# Patient Record
Sex: FEMALE | Race: WHITE | NOT HISPANIC OR LATINO | Employment: FULL TIME | URBAN - METROPOLITAN AREA
[De-identification: names, ages, dates, MRNs, and addresses within clinical notes are randomized per-mention and may not be internally consistent; named-entity substitution may affect disease eponyms.]

---

## 2017-05-08 ENCOUNTER — APPOINTMENT (OUTPATIENT)
Dept: OCCUPATIONAL MEDICINE | Facility: CLINIC | Age: 37
End: 2017-05-08
Payer: OTHER MISCELLANEOUS

## 2017-05-08 PROCEDURE — 99203 OFFICE O/P NEW LOW 30 MIN: CPT

## 2017-05-09 ENCOUNTER — HOSPITAL ENCOUNTER (EMERGENCY)
Facility: HOSPITAL | Age: 37
Discharge: HOME | End: 2017-05-09
Attending: EMERGENCY MEDICINE
Payer: OTHER MISCELLANEOUS

## 2017-05-09 VITALS
OXYGEN SATURATION: 100 % | BODY MASS INDEX: 39.87 KG/M2 | DIASTOLIC BLOOD PRESSURE: 82 MMHG | RESPIRATION RATE: 17 BRPM | TEMPERATURE: 97 F | SYSTOLIC BLOOD PRESSURE: 144 MMHG | HEIGHT: 67 IN | HEART RATE: 85 BPM | WEIGHT: 254 LBS

## 2017-05-09 DIAGNOSIS — M54.16 LUMBAR RADICULOPATHY: Primary | ICD-10-CM

## 2017-05-09 PROCEDURE — 99283 EMERGENCY DEPT VISIT LOW MDM: CPT

## 2017-05-09 PROCEDURE — 96372 THER/PROPH/DIAG INJ SC/IM: CPT

## 2017-05-09 RX ORDER — NAPROXEN 500 MG/1
500 TABLET ORAL 2 TIMES DAILY WITH MEALS
COMMUNITY

## 2017-05-09 RX ORDER — METHOCARBAMOL 500 MG/1
500 TABLET, FILM COATED ORAL 4 TIMES DAILY
COMMUNITY

## 2017-05-09 RX ORDER — TRAMADOL HYDROCHLORIDE 50 MG/1
50 TABLET ORAL EVERY 6 HOURS PRN
COMMUNITY
End: 2017-05-09

## 2017-05-09 RX ORDER — KETOROLAC TROMETHAMINE 30 MG/ML
60 INJECTION, SOLUTION INTRAMUSCULAR; INTRAVENOUS ONCE
Status: COMPLETED | OUTPATIENT
Start: 2017-05-09 | End: 2017-05-09

## 2017-05-09 RX ORDER — TRAMADOL HYDROCHLORIDE 50 MG/1
100 TABLET ORAL EVERY 6 HOURS PRN
Qty: 16 TABLET | Refills: 0 | Status: SHIPPED | OUTPATIENT
Start: 2017-05-09

## 2017-05-09 RX ADMIN — KETOROLAC TROMETHAMINE 60 MG: 30 INJECTION, SOLUTION INTRAMUSCULAR at 10:12

## 2017-05-16 ENCOUNTER — APPOINTMENT (OUTPATIENT)
Dept: OCCUPATIONAL MEDICINE | Facility: CLINIC | Age: 37
End: 2017-05-16
Payer: OTHER MISCELLANEOUS

## 2017-05-16 PROCEDURE — 99213 OFFICE O/P EST LOW 20 MIN: CPT

## 2017-05-22 ENCOUNTER — APPOINTMENT (OUTPATIENT)
Dept: OCCUPATIONAL MEDICINE | Facility: CLINIC | Age: 37
End: 2017-05-22
Payer: OTHER MISCELLANEOUS

## 2017-05-22 PROCEDURE — 99213 OFFICE O/P EST LOW 20 MIN: CPT

## 2019-07-31 ENCOUNTER — HOSPITAL ENCOUNTER (EMERGENCY)
Facility: HOSPITAL | Age: 39
Discharge: HOME | End: 2019-07-31
Admitting: EMERGENCY MEDICINE
Payer: COMMERCIAL

## 2019-07-31 VITALS
HEIGHT: 67 IN | TEMPERATURE: 97.7 F | OXYGEN SATURATION: 95 % | SYSTOLIC BLOOD PRESSURE: 129 MMHG | RESPIRATION RATE: 18 BRPM | BODY MASS INDEX: 36.88 KG/M2 | DIASTOLIC BLOOD PRESSURE: 77 MMHG | WEIGHT: 235 LBS | HEART RATE: 99 BPM

## 2019-07-31 DIAGNOSIS — S51.811A LACERATION OF RIGHT FOREARM: Primary | ICD-10-CM

## 2019-07-31 PROCEDURE — 99282 EMERGENCY DEPT VISIT SF MDM: CPT | Performed by: PHYSICIAN ASSISTANT

## 2019-07-31 PROCEDURE — 12002 RPR S/N/AX/GEN/TRNK2.6-7.5CM: CPT | Performed by: PHYSICIAN ASSISTANT

## 2019-07-31 PROCEDURE — 90471 IMMUNIZATION ADMIN: CPT

## 2019-07-31 PROCEDURE — 90715 TDAP VACCINE 7 YRS/> IM: CPT | Performed by: PHYSICIAN ASSISTANT

## 2019-07-31 PROCEDURE — 99282 EMERGENCY DEPT VISIT SF MDM: CPT

## 2019-07-31 RX ORDER — GINSENG 100 MG
1 CAPSULE ORAL ONCE
Status: COMPLETED | OUTPATIENT
Start: 2019-07-31 | End: 2019-07-31

## 2019-07-31 RX ORDER — LIDOCAINE HYDROCHLORIDE 10 MG/ML
5 INJECTION, SOLUTION EPIDURAL; INFILTRATION; INTRACAUDAL; PERINEURAL ONCE
Status: COMPLETED | OUTPATIENT
Start: 2019-07-31 | End: 2019-07-31

## 2019-07-31 RX ADMIN — TETANUS TOXOID, REDUCED DIPHTHERIA TOXOID AND ACELLULAR PERTUSSIS VACCINE, ADSORBED 0.5 ML: 5; 2.5; 8; 8; 2.5 SUSPENSION INTRAMUSCULAR at 21:19

## 2019-07-31 RX ADMIN — BACITRACIN 1 SMALL APPLICATION: 500 OINTMENT TOPICAL at 21:18

## 2019-07-31 RX ADMIN — LIDOCAINE HYDROCHLORIDE 5 ML: 10 INJECTION, SOLUTION EPIDURAL; INFILTRATION; INTRACAUDAL; PERINEURAL at 21:18

## 2019-08-01 NOTE — DISCHARGE INSTRUCTIONS
Rest, elevate arm  Tylenol/motrin for discomfort  Keep bandage on for 2 days, then remove bandage and clean daily with soap and water, apply antibiotic ointment    Follow up with family doctor in 7-10 days for suture removal

## 2019-08-01 NOTE — ED PROVIDER NOTES
History  Chief Complaint   Patient presents with    Extremity Laceration     Pt c/o of an laceration on right forearm form an exacto knife  Patient is a 34 y/o F that presents to the ED with laceration to right forearm that occurred 10 minutes ago  She states [place] and she cut herself with an exacto knife  She is right handed  No numbness  Last tetanus is unknown  History provided by:  Patient  Laceration   Location:  Shoulder/arm  Shoulder/arm laceration location:  R forearm  Length:  3  Depth: Through dermis  Quality: straight    Time since incident:  10 minutes  Laceration mechanism:  Knife  Pain details:     Quality:  Burning    Severity:  Mild    Timing:  Constant    Progression:  Unchanged  Foreign body present:  No foreign bodies  Relieved by:  Nothing  Worsened by:  Nothing  Ineffective treatments:  None tried  Tetanus status:  Unknown  Associated symptoms: no fever, no numbness and no swelling        Prior to Admission Medications   Prescriptions Last Dose Informant Patient Reported? Taking? methocarbamol (ROBAXIN) 500 mg tablet   Yes No   Sig: Take 500 mg by mouth 4 (four) times a day   naproxen (NAPROSYN) 500 mg tablet   Yes No   Sig: Take 500 mg by mouth 2 (two) times a day with meals   traMADol (ULTRAM) 50 mg tablet   No No   Sig: Take 2 tablets by mouth every 6 (six) hours as needed for severe pain for up to 16 doses      Facility-Administered Medications: None       Past Medical History:   Diagnosis Date    Chronic pain        Past Surgical History:   Procedure Laterality Date    ENDOMETRIAL ABLATION      MULTIPLE TOOTH EXTRACTIONS      TUBAL LIGATION      WISDOM TOOTH EXTRACTION         History reviewed  No pertinent family history  I have reviewed and agree with the history as documented      Social History     Tobacco Use    Smoking status: Current Every Day Smoker     Packs/day: 0 50    Smokeless tobacco: Never Used Substance Use Topics    Alcohol use: No    Drug use: No        Review of Systems   Constitutional: Negative for chills and fever  Skin: Positive for wound  Neurological: Negative for dizziness, weakness and numbness  All other systems reviewed and are negative  Physical Exam  Physical Exam   Constitutional: She is oriented to person, place, and time  She appears well-developed and well-nourished  HENT:   Head: Normocephalic and atraumatic  Eyes: Conjunctivae are normal    Cardiovascular: Normal rate  Pulses:       Radial pulses are 2+ on the right side  Pulmonary/Chest: Effort normal    Musculoskeletal:        Right forearm: She exhibits laceration (3cm linear laceration to right forearm, bleeding controlled with pressure, will require sutures  )  She exhibits no tenderness, no bony tenderness, no swelling and no deformity  Neurological: She is alert and oriented to person, place, and time  She has normal strength  No sensory deficit  Gait normal    Skin: Skin is warm and dry  Laceration (3cm linear laceration right forearm  ) noted  No rash noted  She is not diaphoretic  No pallor  Nursing note and vitals reviewed        Vital Signs  ED Triage Vitals [07/31/19 2115]   Temperature Pulse Respirations Blood Pressure SpO2   97 7 °F (36 5 °C) 99 18 129/77 95 %      Temp Source Heart Rate Source Patient Position - Orthostatic VS BP Location FiO2 (%)   Tympanic Monitor -- Right arm --      Pain Score       2           Vitals:    07/31/19 2115   BP: 129/77   Pulse: 99         Visual Acuity      ED Medications  Medications   lidocaine (PF) (XYLOCAINE-MPF) 1 % injection 5 mL (5 mL Infiltration Given 7/31/19 2118)   bacitracin topical ointment 1 small application (1 small application Topical Given 7/31/19 2118)   tetanus-diphtheria-acellular pertussis (BOOSTRIX) IM injection 0 5 mL (0 5 mL Intramuscular Given 7/31/19 2119)       Diagnostic Studies  Results Reviewed     None                 No orders to display              Procedures  Laceration repair  Date/Time: 7/31/2019 9:20 PM  Performed by: Tray Burns PA-C  Authorized by: Tray Burns PA-C   Consent: Verbal consent obtained  Risks and benefits: risks, benefits and alternatives were discussed  Consent given by: patient  Body area: upper extremity  Location details: right lower arm  Laceration length: 3 cm  Foreign bodies: no foreign bodies  Tendon involvement: none  Nerve involvement: none  Vascular damage: no  Anesthesia: local infiltration    Anesthesia:  Local Anesthetic: lidocaine 1% without epinephrine  Anesthetic total: 3 mL      Procedure Details:  Preparation: Patient was prepped and draped in the usual sterile fashion  Irrigation solution: saline  Irrigation method: tap  Amount of cleaning: standard  Debridement: none  Degree of undermining: none  Skin closure: 5-0 nylon  Number of sutures: 7  Technique: simple  Approximation: close  Approximation difficulty: simple  Dressing: antibiotic ointment and 4x4 sterile gauze  Patient tolerance: Patient tolerated the procedure well with no immediate complications             ED Course                               MDM  Number of Diagnoses or Management Options  Laceration of right forearm: new and does not require workup  Patient Progress  Patient progress: improved      Disposition  Final diagnoses:   Laceration of right forearm     Time reflects when diagnosis was documented in both MDM as applicable and the Disposition within this note     Time User Action Codes Description Comment    7/31/2019  9:40 PM Becky Cox Add [J35 [id number]] Laceration of right forearm       ED Disposition     ED Disposition Condition Date/Time Comment    Discharge Stable Wed Jul 31, 2019  9:40 PM Rylee Blount discharge to home/self care              Follow-up Information     Follow up With Specialties Details Why Contact Info Additional [address] [facility] Call in 1 week For recheck [address] [place], [place], [address]          Patient's Medications   Discharge Prescriptions    No medications on file     No discharge procedures on file      ED Provider  Electronically Signed by           Romain Hill PA-C  07/31/19 6146

## 2019-08-08 ENCOUNTER — APPOINTMENT (OUTPATIENT)
Dept: RADIOLOGY | Facility: CLINIC | Age: 39
End: 2019-08-08
Payer: COMMERCIAL

## 2019-08-08 ENCOUNTER — OFFICE VISIT (OUTPATIENT)
Dept: URGENT CARE | Facility: CLINIC | Age: 39
End: 2019-08-08
Payer: COMMERCIAL

## 2019-08-08 VITALS
OXYGEN SATURATION: 98 % | HEIGHT: 67 IN | DIASTOLIC BLOOD PRESSURE: 76 MMHG | BODY MASS INDEX: 36.1 KG/M2 | WEIGHT: 230 LBS | SYSTOLIC BLOOD PRESSURE: 132 MMHG | RESPIRATION RATE: 16 BRPM | TEMPERATURE: 98 F | HEART RATE: 84 BPM

## 2019-08-08 DIAGNOSIS — Z48.02 ENCOUNTER FOR REMOVAL OF SUTURES: Primary | ICD-10-CM

## 2019-08-08 DIAGNOSIS — S69.91XA HAND INJURY, RIGHT, INITIAL ENCOUNTER: ICD-10-CM

## 2019-08-08 PROCEDURE — 73130 X-RAY EXAM OF HAND: CPT

## 2019-08-08 PROCEDURE — 99213 OFFICE O/P EST LOW 20 MIN: CPT | Performed by: PREVENTIVE MEDICINE

## 2019-08-08 NOTE — PATIENT INSTRUCTIONS
You can use ibuprofen 2 tabs 4 times a day for aching in the right hand  We will call you with radiologist finds a fracture that I have missed

## 2019-08-08 NOTE — PROGRESS NOTES
[facility]        NAME: Cat Jimenez is a 35 y o  female  : 1980    MRN: [id number]  DATE: 2019  TIME: 5:48 PM    Assessment and Plan   Encounter for removal of sutures [Z48 02]  1  Encounter for removal of sutures     2  Hand injury, right, initial encounter  XR hand 3+ vw right         Patient Instructions       Follow up with PCP in 3-5 days  Proceed to  ER if symptoms worsen  Chief Complaint     Chief Complaint   Patient presents with    Suture / Staple Removal     pt had seven sutures placed 8 days ago in her right wrist  she is here to have them removed   Hand Pain     pt reports falling two days ago  yesterday she woke up and "cracked" her fingers on her right hand  she developed pain in her right hand  History of Present Illness       Here for suture removal right forearm 7 sutures  Also 2 days ago she fell on her left side and while she fell she felt pain in her right hand due to the instrument she was holding  She now has pain over the right 4th and 3rd metacarpal       Review of Systems   Review of Systems   Musculoskeletal: Positive for arthralgias           Current Medications       Current Outpatient Medications:     methocarbamol (ROBAXIN) 500 mg tablet, Take 500 mg by mouth 4 (four) times a day, Disp: , Rfl:     naproxen (NAPROSYN) 500 mg tablet, Take 500 mg by mouth 2 (two) times a day with meals, Disp: , Rfl:     traMADol (ULTRAM) 50 mg tablet, Take 2 tablets by mouth every 6 (six) hours as needed for severe pain for up to 16 doses, Disp: 16 tablet, Rfl: 0    Current Allergies     Allergies as of 2019 - Reviewed 2019   Allergen Reaction Noted    Amoxicillin  2017    Latex  2017    Percocet [oxycodone-acetaminophen]  2017    Shellfish-derived products  2017            The following portions of the patient's history were reviewed and updated as appropriate: allergies, current medications, past family history, past medical history, past social history, past surgical history and problem list      Past Medical History:   Diagnosis Date    Chronic pain        Past Surgical History:   Procedure Laterality Date    ENDOMETRIAL ABLATION      MULTIPLE TOOTH EXTRACTIONS      TUBAL LIGATION      WISDOM TOOTH EXTRACTION         No family history on file  Medications have been verified  Objective   /76   Pulse 84   Temp 98 °F (36 7 °C)   Resp 16   Ht 5' 7 25" (1 708 m)   Wt 104 kg (230 lb)   SpO2 98%   BMI 35 76 kg/m²        Physical Exam     Physical Exam   Musculoskeletal:   The right hand is not warm red or swollen  Minimal tenderness to palpation over the proximal base of the 3rd and 4th metacarpal   Excellent range of motion of the hand and strength  Skin:   Seven sutures removed right forearm  Excellent healing without infection       X-ray reveals no acute changes

## 2020-12-16 ENCOUNTER — OFFICE VISIT (OUTPATIENT)
Dept: URGENT CARE | Facility: CLINIC | Age: 40
End: 2020-12-16
Payer: COMMERCIAL

## 2020-12-16 VITALS
WEIGHT: 237 LBS | SYSTOLIC BLOOD PRESSURE: 142 MMHG | HEIGHT: 67 IN | DIASTOLIC BLOOD PRESSURE: 82 MMHG | BODY MASS INDEX: 37.2 KG/M2 | TEMPERATURE: 96.2 F | OXYGEN SATURATION: 98 % | RESPIRATION RATE: 16 BRPM | HEART RATE: 78 BPM

## 2020-12-16 DIAGNOSIS — T61.91XA ALLERGIC REACTION TO SEAFOOD: Primary | ICD-10-CM

## 2020-12-16 PROCEDURE — 99213 OFFICE O/P EST LOW 20 MIN: CPT | Performed by: PHYSICIAN ASSISTANT

## 2020-12-16 RX ORDER — PREDNISONE 10 MG/1
TABLET ORAL
Qty: 21 TABLET | Refills: 0 | Status: SHIPPED | OUTPATIENT
Start: 2020-12-16 | End: 2021-07-01 | Stop reason: ALTCHOICE

## 2021-07-01 ENCOUNTER — OFFICE VISIT (OUTPATIENT)
Dept: URGENT CARE | Facility: CLINIC | Age: 41
End: 2021-07-01
Payer: COMMERCIAL

## 2021-07-01 VITALS — OXYGEN SATURATION: 98 % | HEART RATE: 76 BPM | TEMPERATURE: 97.6 F | RESPIRATION RATE: 16 BRPM

## 2021-07-01 DIAGNOSIS — L23.9 ALLERGIC CONTACT DERMATITIS, UNSPECIFIED TRIGGER: Primary | ICD-10-CM

## 2021-07-01 PROCEDURE — 99213 OFFICE O/P EST LOW 20 MIN: CPT | Performed by: PHYSICIAN ASSISTANT

## 2021-07-01 RX ORDER — METHYLPREDNISOLONE SODIUM SUCCINATE 125 MG/2ML
125 INJECTION, POWDER, LYOPHILIZED, FOR SOLUTION INTRAMUSCULAR; INTRAVENOUS ONCE
Status: COMPLETED | OUTPATIENT
Start: 2021-07-01 | End: 2021-07-01

## 2021-07-01 RX ORDER — TRIAMCINOLONE ACETONIDE 1 MG/G
CREAM TOPICAL 2 TIMES DAILY
Qty: 45 G | Refills: 0 | Status: SHIPPED | OUTPATIENT
Start: 2021-07-01

## 2021-07-01 RX ADMIN — METHYLPREDNISOLONE SODIUM SUCCINATE 125 MG: 125 INJECTION, POWDER, LYOPHILIZED, FOR SOLUTION INTRAMUSCULAR; INTRAVENOUS at 17:05

## 2021-07-01 NOTE — PATIENT INSTRUCTIONS
Contact Dermatitis   WHAT YOU NEED TO KNOW:   Contact dermatitis is a skin rash  It develops when you touch something that irritates your skin or causes an allergic reaction  DISCHARGE INSTRUCTIONS:   Call 911 for any of the following:   · You have sudden trouble breathing  · Your throat swells and you have trouble eating  · Your face is swollen  Contact your healthcare provider if:   · You have a fever  · Your blisters are draining pus  · Your rash spreads or does not get better, even after treatment  · You have questions or concerns about your condition or care  Medicines:   · Medicines  help decrease itching and swelling  They will be given as a topical medicine to apply to your rash or as a pill  · Take your medicine as directed  Contact your healthcare provider if you think your medicine is not helping or if you have side effects  Tell him or her if you are allergic to any medicine  Keep a list of the medicines, vitamins, and herbs you take  Include the amounts, and when and why you take them  Bring the list or the pill bottles to follow-up visits  Carry your medicine list with you in case of an emergency  Manage contact dermatitis:   · Take short baths or showers in cool water  Use mild soap or soap-free cleansers  Add oatmeal, baking soda, or cornstarch to the bath water to help decrease skin irritation  · Avoid skin irritants , such as makeup, hair products, soaps, and cleansers  Use products that do not contain perfume or dye  · Apply a cool compress to your rash  This will help soothe your skin  · Keep your skin moist   Rub unscented cream or lotion on your skin to prevent dryness and itching  Do this right after a bath or shower when your skin is still damp  Follow up with your healthcare provider or dermatologist in 2 to 3 days:  Write down your questions so you remember to ask them during your visits     © Copyright [employer] 2020 Information is for End User's use only and may not be sold, redistributed or otherwise used for commercial purposes  All illustrations and images included in CareNotes® are the copyrighted property of [employer]  or [employer]bil Joel  The above information is an  only  It is not intended as medical advice for individual conditions or treatments  Talk to your doctor, nurse or pharmacist before following any medical regimen to see if it is safe and effective for you

## 2021-07-08 NOTE — PROGRESS NOTES
[facility]        NAME: Amira Lemons is a 38 y o  female  : 1980    MRN: [id number]  DATE:  2021  TIME: 9:01 AM    Assessment and Plan   Allergic contact dermatitis, unspecified trigger [L23 9]  1  Allergic contact dermatitis, unspecified trigger  methylPREDNISolone sodium succinate (Solu-MEDROL) injection 125 mg    triamcinolone (KENALOG) 0 1 % cream         Patient Instructions      discussed condition with patient  She appears to have allergic contact dermatitis to uncertain trigger  She will be given a Solu-Medrol injection as well as topical steroid cream and recommended over-the-counter antihistamines  Should be re-evaluated if condition persists or worsens  Follow up with PCP in 3-5 days  Proceed to  ER if symptoms worsen  Chief Complaint     Chief Complaint   Patient presents with    Rash     itchy rash on her arms and legs  began four days ago  History of Present Illness         Patient presents with four-day history of a diffuse itchy rash spread across both her upper and lower extremities  She denies any new meds, foods, personal skin care products, detergents  She denies any shortness of breath or wheezing, lips/tongue/ throat swelling or tingling, or any other significant symptoms  She has been treating with over-the-counter medications without significant success  Review of Systems   Review of Systems   Constitutional: Negative  HENT: Negative  Respiratory: Negative  Cardiovascular: Negative  Gastrointestinal: Negative  Genitourinary: Negative  Skin: Positive for rash           Current Medications       Current Outpatient Medications:     methocarbamol (ROBAXIN) 500 mg tablet, Take 500 mg by mouth 4 (four) times a day (Patient not taking: Reported on 2021), Disp: , Rfl:     naproxen (NAPROSYN) 500 mg tablet, Take 500 mg by mouth 2 (two) times a day with meals (Patient not taking: Reported on 2021), Disp: , Rfl:    traMADol (ULTRAM) 50 mg tablet, Take 2 tablets by mouth every 6 (six) hours as needed for severe pain for up to 16 doses (Patient not taking: Reported on 12/16/2020), Disp: 16 tablet, Rfl: 0    triamcinolone (KENALOG) 0 1 % cream, Apply topically 2 (two) times a day, Disp: 45 g, Rfl: 0    Current Allergies     Allergies as of 07/01/2021 - Reviewed 07/01/2021   Allergen Reaction Noted    Amoxicillin  05/09/2017    Latex  05/09/2017    Percocet [oxycodone-acetaminophen]  05/09/2017    Shellfish-derived products - food allergy  05/09/2017            The following portions of the patient's history were reviewed and updated as appropriate: allergies, current medications, past family history, past medical history, past social history, past surgical history and problem list      Past Medical History:   Diagnosis Date    Chronic pain        Past Surgical History:   Procedure Laterality Date    ENDOMETRIAL ABLATION      MULTIPLE TOOTH EXTRACTIONS      TUBAL LIGATION      WISDOM TOOTH EXTRACTION         History reviewed  No pertinent family history  Medications have been verified  Objective   Pulse 76   Temp 97 6 °F (36 4 °C)   Resp 16   SpO2 98%   No LMP recorded  Patient has had an ablation  Physical Exam     Physical Exam  Vitals reviewed  Constitutional:       General: She is not in acute distress  Appearance: She is well-developed  HENT:      Mouth/Throat:      Mouth: Mucous membranes are moist  No angioedema  Pharynx: Oropharynx is clear  No pharyngeal swelling or uvula swelling  Cardiovascular:      Rate and Rhythm: Normal rate and regular rhythm  Pulses: Normal pulses  Heart sounds: Normal heart sounds  No murmur heard  Pulmonary:      Effort: Pulmonary effort is normal  No respiratory distress  Breath sounds: Normal breath sounds     Skin:     Findings: Rash (  BilateralArms and legs with diffuse patchy erythematous maculopapular rash that appears inflammatory  There are no areas of blistering or weeping  No significant swelling  No sign of infection) present  Neurological:      Mental Status: She is alert and oriented to person, place, and time

## 2021-09-24 ENCOUNTER — OFFICE VISIT (OUTPATIENT)
Dept: URGENT CARE | Facility: CLINIC | Age: 41
End: 2021-09-24
Payer: COMMERCIAL

## 2021-09-24 VITALS
HEIGHT: 67 IN | TEMPERATURE: 96.8 F | BODY MASS INDEX: 37.2 KG/M2 | DIASTOLIC BLOOD PRESSURE: 78 MMHG | WEIGHT: 237 LBS | OXYGEN SATURATION: 98 % | HEART RATE: 100 BPM | RESPIRATION RATE: 16 BRPM | SYSTOLIC BLOOD PRESSURE: 132 MMHG

## 2021-09-24 DIAGNOSIS — L55.9 SUNBURN: ICD-10-CM

## 2021-09-24 DIAGNOSIS — L03.119 CELLULITIS OF LOWER LEG: Primary | ICD-10-CM

## 2021-09-24 PROCEDURE — 99213 OFFICE O/P EST LOW 20 MIN: CPT | Performed by: PHYSICIAN ASSISTANT

## 2021-09-24 RX ORDER — CEPHALEXIN 500 MG/1
500 CAPSULE ORAL EVERY 8 HOURS SCHEDULED
Qty: 21 CAPSULE | Refills: 0 | Status: SHIPPED | OUTPATIENT
Start: 2021-09-24 | End: 2021-10-01

## 2021-09-24 RX ORDER — PREDNISONE 50 MG/1
50 TABLET ORAL DAILY
Qty: 5 TABLET | Refills: 0 | Status: SHIPPED | OUTPATIENT
Start: 2021-09-24 | End: 2021-09-29

## 2021-09-24 NOTE — PROGRESS NOTES
[facility]        NAME: Olesya Galicia is a 42 y o  female  : 1980    MRN: [id number]  DATE: 2021  TIME: 9:54 AM    Assessment and Plan   Cellulitis of lower leg [L03 119]  1  Cellulitis of lower leg  cephalexin (KEFLEX) 500 mg capsule   2  Sunburn  predniSONE 50 mg tablet         Patient Instructions     Take antibiotic as directed until completed  Take prednisone as directed until completed  motrin and/or tylenol as needed for pain  Follow up with PCP in 3-5 days  Proceed to  ER if symptoms worsen  Chief Complaint     Chief Complaint   Patient presents with    Leg Swelling     pt reports getting sunburn on her b/lLE monday  yesterady the pt's legs became more red and the right leg became swollen         History of Present Illness        44-year-old female presents with redness pain swelling on bilateral lower legs  Patient reports she was outside on Monday and got sunburn on her lower legs  Continues to have lot of pain redness and swelling that she feels is worsening  Denies any fevers or chills  Has taking some Benadryl without any relief  Denies any chest pain shortness of breath or cough  No abdominal pain nausea vomiting or diarrhea    Rash  This is a new problem  The current episode started in the past 7 days  The problem has been gradually worsening since onset  Location: Bilateral lower legs  The rash is characterized by burning, pain, redness and swelling  Pertinent negatives include no cough, fatigue, fever or shortness of breath  Past treatments include antihistamine  The treatment provided no relief  Review of Systems   Review of Systems   Constitutional: Negative  Negative for fatigue and fever  HENT: Negative  Eyes: Negative  Respiratory: Negative  Negative for cough and shortness of breath  Cardiovascular: Negative  Gastrointestinal: Negative  Musculoskeletal: Negative  Skin: Positive for rash  Neurological: Negative  Current Medications       Current Outpatient Medications:     cephalexin (KEFLEX) 500 mg capsule, Take 1 capsule (500 mg total) by mouth every 8 (eight) hours for 7 days, Disp: 21 capsule, Rfl: 0    methocarbamol (ROBAXIN) 500 mg tablet, Take 500 mg by mouth 4 (four) times a day (Patient not taking: Reported on 7/1/2021), Disp: , Rfl:     naproxen (NAPROSYN) 500 mg tablet, Take 500 mg by mouth 2 (two) times a day with meals (Patient not taking: Reported on 7/1/2021), Disp: , Rfl:     predniSONE 50 mg tablet, Take 1 tablet (50 mg total) by mouth daily for 5 days, Disp: 5 tablet, Rfl: 0    traMADol (ULTRAM) 50 mg tablet, Take 2 tablets by mouth every 6 (six) hours as needed for severe pain for up to 16 doses (Patient not taking: Reported on 12/16/2020), Disp: 16 tablet, Rfl: 0    triamcinolone (KENALOG) 0 1 % cream, Apply topically 2 (two) times a day (Patient not taking: Reported on 9/24/2021), Disp: 45 g, Rfl: 0    Current Allergies     Allergies as of 09/24/2021 - Reviewed 09/24/2021   Allergen Reaction Noted    Amoxicillin  05/09/2017    Latex  05/09/2017    Percocet [oxycodone-acetaminophen]  05/09/2017    Shellfish-derived products - food allergy  05/09/2017            The following portions of the patient's history were reviewed and updated as appropriate: allergies, current medications, past family history, past medical history, past social history, past surgical history and problem list      Past Medical History:   Diagnosis Date    Chronic pain        Past Surgical History:   Procedure Laterality Date    ENDOMETRIAL ABLATION      MULTIPLE TOOTH EXTRACTIONS      TUBAL LIGATION      WISDOM TOOTH EXTRACTION         History reviewed  No pertinent family history  Medications have been verified  Objective   /78   Pulse 100   Temp (!) 96 8 °F (36 °C)   Resp 16   Ht 5' 7" (1 702 m)   Wt 108 kg (237 lb)   SpO2 98%   BMI 37 12 kg/m²   No LMP recorded   Patient has had an ablation  Physical Exam     Physical Exam  Vitals and nursing note reviewed  Constitutional:       General: She is not in acute distress  Appearance: She is well-developed  HENT:      Head: Normocephalic and atraumatic  Right Ear: Hearing, tympanic membrane, ear canal and external ear normal       Left Ear: Hearing, tympanic membrane, ear canal and external ear normal       Nose: Nose normal       Mouth/Throat:      Pharynx: Uvula midline  No oropharyngeal exudate  Eyes:      General:         Right eye: No discharge  Left eye: No discharge  Conjunctiva/sclera: Conjunctivae normal    Cardiovascular:      Rate and Rhythm: Normal rate and regular rhythm  Heart sounds: Normal heart sounds  No murmur heard  Pulmonary:      Effort: Pulmonary effort is normal  No respiratory distress  Breath sounds: Normal breath sounds  No wheezing or rales  Abdominal:      General: Bowel sounds are normal       Palpations: Abdomen is soft  Tenderness: There is no abdominal tenderness  Musculoskeletal:         General: Normal range of motion  Cervical back: Normal range of motion and neck supple  Lymphadenopathy:      Cervical: No cervical adenopathy  Skin:     General: Skin is warm and dry  Findings: Erythema ( erythema noted to bilateral lower legs) present  Neurological:      Mental Status: She is alert and oriented to person, place, and time

## 2021-09-24 NOTE — PATIENT INSTRUCTIONS
Take antibiotic as directed until completed  Take prednisone as directed until completed  motrin and/or tylenol as needed for pain  Follow up with PCP in 3-5 days  Proceed to  ER if symptoms worsen  Sunburn   AMBULATORY CARE:   A sunburn  is when your skin is damaged by exposure to ultraviolet (UV) radiation  UV radiation comes from sunlight and devices such as tanning beds  Signs and symptoms of a sunburn  may appear while you are under the UV rays  They may also appear a few hours after your exposure  Your symptoms may become worse 12 to 24 hours later  You may have any of the following:  · Red skin    · Pain or a burning feeling    · Swelling, and a feeling of tightness    · Blisters    · Itchiness    · Peeling and flaking    Seek care immediately if:   · Your skin has many blisters, which break or bleed  · You feel dizzy, weak, or faint  · You have new headaches that do not go away with medicine  · You have problems thinking or remembering things  Contact your healthcare provider if:   · You have a fever  · Your skin is red and itchy from the sunscreen  · You have a new mole, or one that has changed color, shape, or size  · Your skin and mouth are dry, and you feel very thirsty  · You have questions or concerns about your condition or care  Treatment for a sunburn  may include any of the following:  · Acetaminophen  decreases pain and fever  It is available without a doctor's order  Ask how much to take and how often to take it  Follow directions  Read the labels of all other medicines you are using to see if they also contain acetaminophen, or ask your doctor or pharmacist  Acetaminophen can cause liver damage if not taken correctly  Do not use more than 4 grams (4,000 milligrams) total of acetaminophen in one day  · NSAIDs , such as ibuprofen, help decrease swelling, pain, and fever  This medicine is available with or without a doctor's order   NSAIDs can cause stomach bleeding or kidney problems in certain people  If you take blood thinner medicine, always ask your healthcare provider if NSAIDs are safe for you  Always read the medicine label and follow directions  · Steroids  decrease redness, pain, and swelling  This medicine may be given as a pill, or used as a lotion to rub on sunburned areas  Risks of a sunburn: You may become dehydrated  Damaged cells may grow and become cancer cells that cause wounds or growths to appear on your skin  Your risk for skin cancer is increased if you had many sunburns as a child  Cancer cells may also spread to other parts of your body, such as your organs  This can be life-threatening  Manage your symptoms:   · Apply a cool compress  A cool compress or wet towel can help soothe your skin  · Take short baths or showers  Bathe or shower in lukewarm water  Add oatmeal, baking soda, or cornstarch to the bath water to help reduce skin irritation  · Use lotions or gels to keep your skin moist   These include products such as aloe vera, petroleum jelly, or ointments  These may help cool your skin and decrease pain and redness  Ask which products would be best for you to use  · Drink liquids as directed  This will help prevent dehydration  Ask which liquids are best for you and how much liquid to drink each day  Prevent another sunburn:       · Wear sunscreen with an SPF of 15 or higher  Put sunscreen on 15 to 30 minutes before you go outside, and again every 2 hours  You will need to put sunscreen on again after you swim, sweat, or dry yourself with a towel  · Wear clothing that will block UV rays  This includes dark, loose clothing made of a tight weave fabric  Pants, long-sleeved shirts, wide-brimmed hats, and sunglasses also help block UV rays  · Stay indoors between 10 AM and 3 PM   This will help you avoid the highest concentrations of UV rays  · Limit exposure    Do not stay outdoors or in tanning beds for long periods  · Ask about vitamin supplements  Vitamins A, C, and E may help protect your skin against UV radiation  Follow up with your healthcare provider as directed:  Write down your questions so you remember to ask them during your visits  © Copyright [employer] 2021 Information is for End User's use only and may not be sold, redistributed or otherwise used for commercial purposes  All illustrations and images included in CareNotes® are the copyrighted property of [employer]  or [employer]  The above information is an  only  It is not intended as medical advice for individual conditions or treatments  Talk to your doctor, nurse or pharmacist before following any medical regimen to see if it is safe and effective for you  Cellulitis   AMBULATORY CARE:   Cellulitis  is a skin infection caused by bacteria  Cellulitis is common and can become severe  Cellulitis usually appears on the lower legs  It can also appear on the arms, face, and other areas  Cellulitis develops when bacteria enter a crack or break in your skin, such as a scratch, bite, or cut  Common signs and symptoms:  Signs and symptoms usually appear on one side of your body  You may have any of the following:  · A fever    · A red, warm, swollen area on your skin    · Pain when the area is touched    · Red spots, bumps, or blisters that may drain pus    · Bumpy, raised skin that feels like an orange peel    Seek care immediately if:   · Your wound gets larger and more painful  · You feel a crackling under your skin when you touch it  · You have purple dots or bumps on your skin, or you see bleeding under your skin  · You see red streaks coming from the infected area  Call your doctor if:   · The red, warm, swollen area gets larger  · Your fever or pain does not go away or gets worse  · The area does not get smaller after 3 days of antibiotics      · You have questions or concerns about your condition or care  Treatment:  You should start to see improvement in 3 days  If your cellulitis is severe, you may need IV antibiotics in the hospital  If cellulitis is not treated, the infection can spread through your body and become life-threatening  You may need any of the following medicines:  · Antibiotics  help treat the bacterial infection  · Acetaminophen  decreases pain and fever  It is available without a doctor's order  Ask how much to take and how often to take it  Follow directions  Read the labels of all other medicines you are using to see if they also contain acetaminophen, or ask your doctor or pharmacist  Acetaminophen can cause liver damage if not taken correctly  Do not use more than 4 grams (4,000 milligrams) total of acetaminophen in one day  · NSAIDs , such as ibuprofen, help decrease swelling, pain, and fever  This medicine is available with or without a doctor's order  NSAIDs can cause stomach bleeding or kidney problems in certain people  If you take blood thinner medicine, always ask your healthcare provider if NSAIDs are safe for you  Always read the medicine label and follow directions  · Take your medicine as directed  Contact your healthcare provider if you think your medicine is not helping or if you have side effects  Tell him or her if you are allergic to any medicine  Keep a list of the medicines, vitamins, and herbs you take  Include the amounts, and when and why you take them  Bring the list or the pill bottles to follow-up visits  Carry your medicine list with you in case of an emergency  Self-care:   · Wash the area with soap and water every day  Gently pat dry  Use bandages if directed by your healthcare provider  · Elevate the area above the level of your heart  as often as you can  This will help decrease swelling and pain  Prop the area on pillows or blankets to keep it elevated comfortably  · Place a cool, damp cloth on the area    Use clean cloths and clean water  You can do this as often as you need to  Cool, damp cloths may help decrease pain  · Apply cream or ointment as directed  These help protect the area  Most over-the-counter products, such as petroleum jelly, are good to use  Ask your healthcare provider about specific creams or ointments you should use  Prevent cellulitis:   · Do not scratch bug bites or areas of injury  You increase your risk for cellulitis by scratching these areas  · Do not share personal items, such as towels, clothing, and razors  · Clean exercise equipment  with germ-killing  before and after you use it  · Treat athlete's foot  This can help prevent the spread of a bacterial skin infection  · Wash your hands often  Use soap and water  Wash your hands after you use the bathroom, change a child's diapers, or sneeze  Wash your hands before you prepare or eat food  Use lotion to prevent dry, cracked skin  Follow up with your doctor within 3 days, or as directed:  He or she will check if your cellulitis is getting better  Write down your questions so you remember to ask them during your visits  © Copyright [employer] 2021 Information is for End User's use only and may not be sold, redistributed or otherwise used for commercial purposes  All illustrations and images included in CareNotes® are the copyrighted property of [employer]  or [employer]bil Joel  The above information is an  only  It is not intended as medical advice for individual conditions or treatments  Talk to your doctor, nurse or pharmacist before following any medical regimen to see if it is safe and effective for you

## 2022-01-04 ENCOUNTER — NURSE TRIAGE (OUTPATIENT)
Dept: OTHER | Facility: OTHER | Age: 42
End: 2022-01-04

## 2022-01-04 DIAGNOSIS — Z20.822 SUSPECTED 2019 NOVEL CORONAVIRUS INFECTION: Primary | ICD-10-CM

## 2022-01-04 PROCEDURE — 87636 SARSCOV2 & INF A&B AMP PRB: CPT | Performed by: FAMILY MEDICINE

## 2022-01-04 NOTE — TELEPHONE ENCOUNTER
Regarding: COVID-Symptomatic- fever, runny nose  ----- Message from Amanda Newman sent at 1/4/2022  3:26 PM EST -----  "I would like to be tested, I have a cough, fever, runny nose, headache and chills '

## 2022-01-04 NOTE — TELEPHONE ENCOUNTER
Reason for Disposition   [1] COVID-19 infection suspected by caller or triager AND [2] mild symptoms (cough, fever, or others) AND [3] has not gotten tested yet    Answer Assessment - Initial Assessment Questions  Were you within 6 feet or less, for up to 15 minutes or more with a person that has a confirmed COVID-19 test? Unsure    What was the date of your exposure?  N/a    Are you experiencing any symptoms attributed to the virus?  (Assess for SOB, cough, fever, difficulty breathing) fever, cough, congestion, sinus headache, chills, body aches    HIGH RISK: Do you have any history heart or lung conditions, weakened immune system, diabetes, Asthma, CHF, HIV, COPD, Chemo, renal failure, sickle cell, etc? Denies    VACCINE: "Have you gotten the COVID-19 vaccine?" If Yes ask: "Which one, how many shots, when did you get it?" Denies    Protocols used: CORONAVIRUS (COVID-19) DIAGNOSED OR SUSPECTED-ADULT-[place]

## 2022-09-25 ENCOUNTER — APPOINTMENT (OUTPATIENT)
Dept: RADIOLOGY | Facility: CLINIC | Age: 42
End: 2022-09-25
Payer: COMMERCIAL

## 2022-09-25 ENCOUNTER — OFFICE VISIT (OUTPATIENT)
Dept: URGENT CARE | Facility: CLINIC | Age: 42
End: 2022-09-25
Payer: COMMERCIAL

## 2022-09-25 VITALS
HEIGHT: 67 IN | SYSTOLIC BLOOD PRESSURE: 134 MMHG | TEMPERATURE: 97.8 F | DIASTOLIC BLOOD PRESSURE: 96 MMHG | HEART RATE: 82 BPM | WEIGHT: 230 LBS | BODY MASS INDEX: 36.1 KG/M2 | OXYGEN SATURATION: 94 % | RESPIRATION RATE: 18 BRPM

## 2022-09-25 DIAGNOSIS — S49.92XA INJURY OF LEFT SHOULDER, INITIAL ENCOUNTER: ICD-10-CM
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
## 2022-09-25 DIAGNOSIS — S80.01XA CONTUSION OF RIGHT KNEE, INITIAL ENCOUNTER: Primary | ICD-10-CM

## 2022-09-25 DIAGNOSIS — L03.115 BILATERAL CELLULITIS OF LOWER LEG: ICD-10-CM

## 2022-09-25 DIAGNOSIS — L03.116 BILATERAL CELLULITIS OF LOWER LEG: ICD-10-CM

## 2022-09-25 DIAGNOSIS — M25.561 ACUTE PAIN OF RIGHT KNEE: ICD-10-CM

## 2022-09-25 DIAGNOSIS — S40.012A CONTUSION OF LEFT SHOULDER, INITIAL ENCOUNTER: ICD-10-CM

## 2022-09-25 PROCEDURE — 99213 OFFICE O/P EST LOW 20 MIN: CPT | Performed by: PHYSICIAN ASSISTANT

## 2022-09-25 PROCEDURE — 73564 X-RAY EXAM KNEE 4 OR MORE: CPT

## 2022-09-25 PROCEDURE — 73030 X-RAY EXAM OF SHOULDER: CPT

## 2022-09-25 RX ORDER — CEPHALEXIN 500 MG/1
500 CAPSULE ORAL EVERY 8 HOURS SCHEDULED
Qty: 21 CAPSULE | Refills: 0 | Status: SHIPPED | OUTPATIENT
Start: 2022-09-25 | End: 2022-10-02

## 2022-09-30 NOTE — PROGRESS NOTES
[facility]        NAME: Winiferd Stubbs is a 36 y o  female  : 1980    MRN: [id number]  DATE:  2022  TIME: 9:21 AM    Assessment and Plan   Contusion of right knee, initial encounter [S80 01XA]  1  Contusion of right knee, initial encounter  XR knee 4+ vw right injury    Ambulatory Referral to Orthopedic Surgery   2  Contusion of left shoulder, initial encounter  XR shoulder 2+ vw left    Ambulatory Referral to Orthopedic Surgery   3  Injury due to fall, initial encounter  Ambulatory Referral to Orthopedic Surgery   4  Bilateral cellulitis of lower leg  cephalexin (KEFLEX) 500 mg capsule         Patient Instructions     Patient has sustained contusions of the right knee and left shoulder as result of a fall  X-rays are negative for any acute findings  I recommended RICE, NSAIDs, limited weight-bearing, gentle intermittent stretching/range of motion exercises as tolerated  She was referred to Orthopedics for consult if condition persists or worsens  She also has blotchy erythema of her lower extremities and this has been a sign of early cellulitis in the past   I prescribed her a one-week course of antibiotics to treat for this  Follow up with PCP in 3-5 days  Proceed to  ER if symptoms worsen  Chief Complaint     Chief Complaint   Patient presents with    right eric pain    left shoulder pain     Pt  States she fell in the shower roughly 2-3 weeks ago  The pain was getting better, but yesterday her knee popped and it has been very painful since  She also states her cellulitis is flaring up on both her legs  History of Present Illness       Patient presents approximately 2-3 weeks after falling in the shower and injuring her right knee and left shoulder  She reports she has been applying conservative measures and condition was improving but recently she felt a pop in her right knee and now the pain has increased  Pain is worse with walking and weight-bearing  She is also concerned because she has started to notice some discomfort and redness in her lower extremities which has been consistent in the past with cellulitis that she has had intermittently  Denies fever, chills, or other symptoms  Review of Systems   Review of Systems   Constitutional: Negative  Respiratory: Negative  Cardiovascular: Negative  Gastrointestinal: Negative  Genitourinary: Negative  Musculoskeletal:        Right knee and left shoulder pain status post fall with injury   Skin: Positive for color change (Bilateral lower legs)           Current Medications       Current Outpatient Medications:     cephalexin (KEFLEX) 500 mg capsule, Take 1 capsule (500 mg total) by mouth every 8 (eight) hours for 7 days, Disp: 21 capsule, Rfl: 0    methocarbamol (ROBAXIN) 500 mg tablet, Take 500 mg by mouth 4 (four) times a day (Patient not taking: Reported on 7/1/2021), Disp: , Rfl:     naproxen (NAPROSYN) 500 mg tablet, Take 500 mg by mouth 2 (two) times a day with meals (Patient not taking: Reported on 7/1/2021), Disp: , Rfl:     traMADol (ULTRAM) 50 mg tablet, Take 2 tablets by mouth every 6 (six) hours as needed for severe pain for up to 16 doses (Patient not taking: Reported on 12/16/2020), Disp: 16 tablet, Rfl: 0    triamcinolone (KENALOG) 0 1 % cream, Apply topically 2 (two) times a day (Patient not taking: Reported on 9/24/2021), Disp: 45 g, Rfl: 0    Current Allergies     Allergies as of 09/25/2022 - Reviewed 09/25/2022   Allergen Reaction Noted    Amoxicillin  05/09/2017    Latex  05/09/2017    Percocet [oxycodone-acetaminophen]  05/09/2017    Shellfish-derived products - food allergy  05/09/2017            The following portions of the patient's history were reviewed and updated as appropriate: allergies, current medications, past family history, past medical history, past social history, past surgical history and problem list      Past Medical History:   Diagnosis Date    Chronic pain        Past Surgical History:   Procedure Laterality Date    ENDOMETRIAL ABLATION      MULTIPLE TOOTH EXTRACTIONS      TUBAL LIGATION      WISDOM TOOTH EXTRACTION         No family history on file  Medications have been verified  Objective   /96   Pulse 82   Temp 97 8 °F (36 6 °C)   Resp 18   Ht 5' 7" (1 702 m)   Wt 104 kg (230 lb)   SpO2 94%   BMI 36 02 kg/m²   No LMP recorded  Physical Exam     Physical Exam  Vitals reviewed  Constitutional:       General: She is not in acute distress  Appearance: She is well-developed  Musculoskeletal:      Right lower leg: No edema  Left lower leg: No edema  Comments: Tenderness to palpation over the right anterior knee with mild soft tissue swelling  No ecchymosis or deformity  Crepitus noted on passive extension  Left shoulder with mild tenderness over the glenohumeral joint but range of motion is overall intact  No crepitus or sign of instability noted  Negative empty can test    Skin:     Comments: Mild small localized area of patchy, blotchy erythema both lower legs which may resemble early cellulitis  Neurological:      Mental Status: She is alert and oriented to person, place, and time

## 2022-12-27 ENCOUNTER — OFFICE VISIT (OUTPATIENT)
Dept: URGENT CARE | Facility: CLINIC | Age: 42
End: 2022-12-27

## 2022-12-27 VITALS
OXYGEN SATURATION: 97 % | RESPIRATION RATE: 16 BRPM | TEMPERATURE: 97.9 F | HEART RATE: 77 BPM | DIASTOLIC BLOOD PRESSURE: 87 MMHG | SYSTOLIC BLOOD PRESSURE: 133 MMHG

## 2022-12-27 DIAGNOSIS — J01.10 ACUTE NON-RECURRENT FRONTAL SINUSITIS: Primary | ICD-10-CM

## 2022-12-27 RX ORDER — ALBUTEROL SULFATE 90 UG/1
2 AEROSOL, METERED RESPIRATORY (INHALATION) EVERY 4 HOURS PRN
Qty: 18 G | Refills: 0 | Status: SHIPPED | OUTPATIENT
Start: 2022-12-27

## 2022-12-27 RX ORDER — AZITHROMYCIN 250 MG/1
TABLET, FILM COATED ORAL
Qty: 6 TABLET | Refills: 0 | Status: SHIPPED | OUTPATIENT
Start: 2022-12-27 | End: 2022-12-31

## 2022-12-27 RX ORDER — METHYLPREDNISOLONE 4 MG/1
TABLET ORAL
Qty: 21 TABLET | Refills: 0 | Status: SHIPPED | OUTPATIENT
Start: 2022-12-27

## 2022-12-27 NOTE — PROGRESS NOTES
[facility]        NAME: Fabby Edwards is a 36 y o  female  : 1980    MRN: [id number]  DATE: 2022  TIME: 3:49 PM    Assessment and Plan   Acute non-recurrent frontal sinusitis [J01 10]  1  Acute non-recurrent frontal sinusitis  azithromycin (ZITHROMAX) 250 mg tablet    methylPREDNISolone 4 MG tablet therapy pack    albuterol (Ventolin HFA) 90 mcg/act inhaler            Patient Instructions       Follow up with PCP in 3-5 days  Proceed to  ER if symptoms worsen  Chief Complaint     Chief Complaint   Patient presents with   • Cough     1 month:  pruritic throat, nasal congestion, sinus pressure/pain, chest tightness (pt using son's inhaler), fatigue  Denies fever, chills, body aches  Pt covid tested last on 2 weeks ago (negative)  History of Present Illness       48-year-old female with increased chest tightness and shortness of breath for the past 2 days  She also reports having a cough which is now become productive in the past week  She did use her son's albuterol which provided good improvement in her breathing  Denies any fevers or chills  Review of Systems   Review of Systems   Constitutional: Negative  HENT: Positive for congestion  Eyes: Negative  Respiratory: Positive for cough, shortness of breath and wheezing  Cardiovascular: Negative  Gastrointestinal: Negative  Genitourinary: Negative  Musculoskeletal: Positive for arthralgias and myalgias  Skin: Negative  Allergic/Immunologic: Negative  Neurological: Negative  Hematological: Negative  Psychiatric/Behavioral: Negative            Current Medications       Current Outpatient Medications:   •  albuterol (Ventolin HFA) 90 mcg/act inhaler, Inhale 2 puffs every 4 (four) hours as needed for wheezing or shortness of breath, Disp: 18 g, Rfl: 0  •  azithromycin (ZITHROMAX) 250 mg tablet, Take 2 tablets today then 1 tablet daily x 4 days, Disp: 6 tablet, Rfl: 0  • methylPREDNISolone 4 MG tablet therapy pack, Use as directed on package, Disp: 21 tablet, Rfl: 0  •  methocarbamol (ROBAXIN) 500 mg tablet, Take 500 mg by mouth 4 (four) times a day (Patient not taking: Reported on 7/1/2021), Disp: , Rfl:   •  naproxen (NAPROSYN) 500 mg tablet, Take 500 mg by mouth 2 (two) times a day with meals (Patient not taking: Reported on 7/1/2021), Disp: , Rfl:   •  traMADol (ULTRAM) 50 mg tablet, Take 2 tablets by mouth every 6 (six) hours as needed for severe pain for up to 16 doses (Patient not taking: Reported on 12/16/2020), Disp: 16 tablet, Rfl: 0  •  triamcinolone (KENALOG) 0 1 % cream, Apply topically 2 (two) times a day (Patient not taking: Reported on 9/24/2021), Disp: 45 g, Rfl: 0    Current Allergies     Allergies as of 12/27/2022 - Reviewed 12/27/2022   Allergen Reaction Noted   • Amoxicillin  05/09/2017   • Latex  05/09/2017   • Percocet [oxycodone-acetaminophen]  05/09/2017   • Shellfish-derived products - food allergy  05/09/2017            The following portions of the patient's history were reviewed and updated as appropriate: allergies, current medications, past family history, past medical history, past social history, past surgical history and problem list      Past Medical History:   Diagnosis Date   • Chronic pain        Past Surgical History:   Procedure Laterality Date   • ENDOMETRIAL ABLATION     • MULTIPLE TOOTH EXTRACTIONS     • TUBAL LIGATION     • WISDOM TOOTH EXTRACTION         No family history on file  Medications have been verified  Objective   /87   Pulse 77   Temp 97 9 °F (36 6 °C)   Resp 16   SpO2 97%   No LMP recorded  Physical Exam     Physical Exam  Vitals and nursing note reviewed  Constitutional:       Appearance: She is well-developed  HENT:      Head: Normocephalic  Nose: Congestion present  Mouth/Throat:      Pharynx: No oropharyngeal exudate or posterior oropharyngeal erythema     Eyes:      Pupils: Pupils are equal, round, and reactive to light  Pulmonary:      Effort: Pulmonary effort is normal  No respiratory distress  Breath sounds: Wheezing present  Musculoskeletal:         General: Normal range of motion  Skin:     General: Skin is warm and dry  Neurological:      Mental Status: She is alert and oriented to person, place, and time

## 2023-02-25 PROBLEM — J01.10 ACUTE NON-RECURRENT FRONTAL SINUSITIS: Status: RESOLVED | Noted: 2022-12-27 | Resolved: 2023-02-25

## 2023-03-20 ENCOUNTER — OFFICE VISIT (OUTPATIENT)
Dept: URGENT CARE | Facility: CLINIC | Age: 43
End: 2023-03-20

## 2023-03-20 ENCOUNTER — APPOINTMENT (OUTPATIENT)
Dept: RADIOLOGY | Facility: CLINIC | Age: 43
End: 2023-03-20

## 2023-03-20 VITALS
WEIGHT: 230 LBS | HEIGHT: 67 IN | TEMPERATURE: 98.7 F | BODY MASS INDEX: 36.1 KG/M2 | HEART RATE: 92 BPM | SYSTOLIC BLOOD PRESSURE: 130 MMHG | OXYGEN SATURATION: 99 % | RESPIRATION RATE: 16 BRPM | DIASTOLIC BLOOD PRESSURE: 84 MMHG

## 2023-03-20 DIAGNOSIS — S63.502A SPRAIN OF LEFT WRIST, INITIAL ENCOUNTER: Primary | ICD-10-CM
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
## 2023-03-20 DIAGNOSIS — S69.92XA INJURY OF LEFT WRIST, INITIAL ENCOUNTER: ICD-10-CM

## 2023-03-20 NOTE — PATIENT INSTRUCTIONS
Wrist Sprain   WHAT YOU NEED TO KNOW:   A wrist sprain happens when one or more ligaments in your wrist stretch or tear  Ligaments are tough tissues that connect bones and keep them in place, and support your joints  DISCHARGE INSTRUCTIONS:   Return to the emergency department if:   You have severe pain or swelling  Your injured wrist is red or has red streaks spreading from the injured area  You have new trouble moving your hands, fingers, or wrist     Your wrist, hand, or fingers feel cold or numb  Your fingernails turn blue or gray  Call your doctor if:   Your symptoms get worse  You have pain and swelling for more than 48 hours  You have questions or concerns about your condition or care  Medicines: You may need any of the following:  NSAIDs , such as ibuprofen, help decrease swelling, pain, and fever  NSAIDs can cause stomach bleeding or kidney problems in certain people  If you take blood thinner medicine, always ask your healthcare provider if NSAIDs are safe for you  Always read the medicine label and follow directions  Acetaminophen  decreases pain and fever  It is available without a doctor's order  Ask how much to take and how often to take it  Follow directions  Read the labels of all other medicines you are using to see if they also contain acetaminophen, or ask your doctor or pharmacist  Acetaminophen can cause liver damage if not taken correctly  Take your medicine as directed  Contact your healthcare provider if you think your medicine is not helping or if you have side effects  Tell your provider if you are allergic to any medicine  Keep a list of the medicines, vitamins, and herbs you take  Include the amounts, and when and why you take them  Bring the list or the pill bottles to follow-up visits  Carry your medicine list with you in case of an emergency  Self-care:   Rest  your wrist for at least 48 hours  Avoid activities that cause pain      Ice  your wrist for 15 to 20 minutes every hour or as directed  Use an ice pack, or put crushed ice in a plastic bag  Cover it with a towel before you put it on your wrist  Ice helps prevent tissue damage and decreases swelling and pain  Compress  your wrist with an elastic bandage  This will help decrease swelling, support your wrist, and help it heal  Wear your wrist wrap as directed  The elastic bandage should be snug but not tight  Elevate  your wrist above the level of your heart as often as you can  This will help decrease swelling and pain  Prop your wrist on pillows or blankets to keep it elevated comfortably  Wrist support: You may need to wear a splint or cast to support your wrist and prevent more damage  Wear your splint as directed  Ask for instructions on how to bathe while you are wearing a splint or cast   Physical therapy:  Your healthcare provider may recommend that you go to physical therapy  A physical therapist teaches you exercises to help improve movement and strength, and to decrease pain  Follow up with your doctor as directed:  Write down your questions so you remember to ask them during your visits  © Copyright [facility] 2022 Information is for End User's use only and may not be sold, redistributed or otherwise used for commercial purposes  The above information is an  only  It is not intended as medical advice for individual conditions or treatments  Talk to your doctor, nurse or pharmacist before following any medical regimen to see if it is safe and effective for you

## 2023-03-28 NOTE — PROGRESS NOTES
[address] [facility]        NAME: Robbie Cota is a 36 y o  female  : 1980    MRN: [id number]  DATE: 2023  TIME: 7:09 PM    Assessment and Plan   Sprain of left wrist, initial encounter [S63 [id number]]  1  Sprain of left wrist, initial encounter  XR hand 3+ vw left      2  Fall down stairs, initial encounter              Patient Instructions     Patient has sustained left wrist sprain due to a fall  X-ray is negative for fracture or dislocation  Recommended RICE, continuing with ibuprofen  Recommend reevaluation 1 to 2 weeks if not significantly improved  Follow up with PCP in 3-5 days  Proceed to  ER if symptoms worsen  Chief Complaint     Chief Complaint   Patient presents with   • Wrist Injury     Pt reports left wrist injury resulting from a fall down four stairs yesterday  C/o pain with swelling  Denies any bruising  Managing symptoms at home with ibuprofen last dose 05:30 am this morning  History of Present Illness       Patient presents after sustaining fall with injury to her left wrist which occurred yesterday  She reports she fell down 4 stairs but denies any other area of injury sustained  She complains of pain and swelling  Has been taking ibuprofen  Review of Systems   Review of Systems   Constitutional: Negative  Respiratory: Negative  Cardiovascular: Negative  Gastrointestinal: Negative  Genitourinary: Negative  Musculoskeletal:        Left wrist pain and swelling status post fall with injury   Neurological: Negative            Current Medications       Current Outpatient Medications:   •  albuterol (Ventolin HFA) 90 mcg/act inhaler, Inhale 2 puffs every 4 (four) hours as needed for wheezing or shortness of breath, Disp: 18 g, Rfl: 0  •  methocarbamol (ROBAXIN) 500 mg tablet, Take 500 mg by mouth 4 (four) times a day (Patient not taking: Reported on 2021), Disp: , Rfl:   •  methylPREDNISolone 4 MG tablet therapy pack, Use as directed on "package (Patient not taking: Reported on 3/20/2023), Disp: 21 tablet, Rfl: 0  •  naproxen (NAPROSYN) 500 mg tablet, Take 500 mg by mouth 2 (two) times a day with meals (Patient not taking: Reported on 7/1/2021), Disp: , Rfl:   •  traMADol (ULTRAM) 50 mg tablet, Take 2 tablets by mouth every 6 (six) hours as needed for severe pain for up to 16 doses (Patient not taking: Reported on 12/16/2020), Disp: 16 tablet, Rfl: 0  •  triamcinolone (KENALOG) 0 1 % cream, Apply topically 2 (two) times a day (Patient not taking: Reported on 9/24/2021), Disp: 45 g, Rfl: 0    Current Allergies     Allergies as of 03/20/2023 - Reviewed 03/20/2023   Allergen Reaction Noted   • Amoxicillin  05/09/2017   • Latex  05/09/2017   • Percocet [oxycodone-acetaminophen]  05/09/2017   • Shellfish-derived products - food allergy  05/09/2017            The following portions of the patient's history were reviewed and updated as appropriate: allergies, current medications, past family history, past medical history, past social history, past surgical history and problem list      Past Medical History:   Diagnosis Date   • Chronic pain        Past Surgical History:   Procedure Laterality Date   • ENDOMETRIAL ABLATION     • MULTIPLE TOOTH EXTRACTIONS     • TUBAL LIGATION     • WISDOM TOOTH EXTRACTION         History reviewed  No pertinent family history  Medications have been verified  Objective   /84 (BP Location: Right arm, Patient Position: Sitting)   Pulse 92   Temp 98 7 °F (37 1 °C)   Resp 16   Ht 5' 7\" (1 702 m)   Wt 104 kg (230 lb)   SpO2 99%   BMI 36 02 kg/m²   No LMP recorded  Physical Exam     Physical Exam  Vitals reviewed  Constitutional:       General: She is not in acute distress  Appearance: She is well-developed  Musculoskeletal:      Comments: Tenderness to palpation left wrist joint worsened with passive range of motion which is overall intact  Mild soft tissue swelling    No ecchymosis or " significant deformity noted  Neurological:      Mental Status: She is alert and oriented to person, place, and time  Sensory: No sensory deficit